# Patient Record
Sex: FEMALE | Race: WHITE | NOT HISPANIC OR LATINO | URBAN - METROPOLITAN AREA
[De-identification: names, ages, dates, MRNs, and addresses within clinical notes are randomized per-mention and may not be internally consistent; named-entity substitution may affect disease eponyms.]

---

## 2023-07-25 NOTE — ASU PATIENT PROFILE, ADULT - NSICDXPASTSURGICALHX_GEN_ALL_CORE_FT
PAST SURGICAL HISTORY:  H/O endoscopy 2016    H/O straightening of nasal septum 2017    S/P bunionectomy 2010    S/P  section 37 years ago    S/P colonoscopy 2018

## 2023-07-25 NOTE — ASU PATIENT PROFILE, ADULT - FALL HARM RISK - RISK INTERVENTIONS
Monitor gait and stability Reinforce activity limits and safety measures with patient and family/Physically safe environment - no spills, clutter or unnecessary equipment

## 2023-08-07 ENCOUNTER — OUTPATIENT (OUTPATIENT)
Dept: OUTPATIENT SERVICES | Facility: HOSPITAL | Age: 73
LOS: 1 days | End: 2023-08-07
Payer: MEDICARE

## 2023-08-07 VITALS
HEART RATE: 58 BPM | RESPIRATION RATE: 18 BRPM | HEIGHT: 64 IN | TEMPERATURE: 98 F | WEIGHT: 160.06 LBS | DIASTOLIC BLOOD PRESSURE: 72 MMHG | SYSTOLIC BLOOD PRESSURE: 165 MMHG

## 2023-08-07 VITALS
RESPIRATION RATE: 26 BRPM | HEART RATE: 47 BPM | SYSTOLIC BLOOD PRESSURE: 139 MMHG | DIASTOLIC BLOOD PRESSURE: 69 MMHG | OXYGEN SATURATION: 97 %

## 2023-08-07 DIAGNOSIS — Z98.891 HISTORY OF UTERINE SCAR FROM PREVIOUS SURGERY: Chronic | ICD-10-CM

## 2023-08-07 DIAGNOSIS — C90.00 MULTIPLE MYELOMA NOT HAVING ACHIEVED REMISSION: ICD-10-CM

## 2023-08-07 DIAGNOSIS — Z98.890 OTHER SPECIFIED POSTPROCEDURAL STATES: Chronic | ICD-10-CM

## 2023-08-07 LAB
APTT BLD: 31.3 SEC — SIGNIFICANT CHANGE UP (ref 24.5–35.6)
HCT VFR BLD CALC: 42.9 % — SIGNIFICANT CHANGE UP (ref 34.5–45)
HGB BLD-MCNC: 14.5 G/DL — SIGNIFICANT CHANGE UP (ref 11.5–15.5)
INR BLD: 0.84 RATIO — LOW (ref 0.85–1.18)
MCHC RBC-ENTMCNC: 32 PG — SIGNIFICANT CHANGE UP (ref 27–34)
MCHC RBC-ENTMCNC: 33.8 GM/DL — SIGNIFICANT CHANGE UP (ref 32–36)
MCV RBC AUTO: 94.7 FL — SIGNIFICANT CHANGE UP (ref 80–100)
NRBC # BLD: 0 /100 WBCS — SIGNIFICANT CHANGE UP (ref 0–0)
PLATELET # BLD AUTO: 221 K/UL — SIGNIFICANT CHANGE UP (ref 150–400)
PROTHROM AB SERPL-ACNC: 9.9 SEC — SIGNIFICANT CHANGE UP (ref 9.5–13)
RBC # BLD: 4.53 M/UL — SIGNIFICANT CHANGE UP (ref 3.8–5.2)
RBC # FLD: 14.4 % — SIGNIFICANT CHANGE UP (ref 10.3–14.5)
WBC # BLD: 5.24 K/UL — SIGNIFICANT CHANGE UP (ref 3.8–10.5)
WBC # FLD AUTO: 5.24 K/UL — SIGNIFICANT CHANGE UP (ref 3.8–10.5)

## 2023-08-07 PROCEDURE — 85610 PROTHROMBIN TIME: CPT

## 2023-08-07 PROCEDURE — 93010 ELECTROCARDIOGRAM REPORT: CPT

## 2023-08-07 PROCEDURE — 99205 OFFICE O/P NEW HI 60 MIN: CPT

## 2023-08-07 PROCEDURE — 85027 COMPLETE CBC AUTOMATED: CPT

## 2023-08-07 PROCEDURE — 85730 THROMBOPLASTIN TIME PARTIAL: CPT

## 2023-08-07 PROCEDURE — 93005 ELECTROCARDIOGRAM TRACING: CPT

## 2023-08-07 PROCEDURE — 36415 COLL VENOUS BLD VENIPUNCTURE: CPT

## 2023-08-07 RX ORDER — CLARITHROMYCIN 500 MG
1 TABLET ORAL
Refills: 0 | DISCHARGE

## 2023-08-07 RX ORDER — FENTANYL CITRATE 50 UG/ML
200 INJECTION INTRAVENOUS ONCE
Refills: 0 | Status: DISCONTINUED | OUTPATIENT
Start: 2023-08-07 | End: 2023-08-07

## 2023-08-07 RX ORDER — MIDAZOLAM HYDROCHLORIDE 1 MG/ML
4 INJECTION, SOLUTION INTRAMUSCULAR; INTRAVENOUS ONCE
Refills: 0 | Status: DISCONTINUED | OUTPATIENT
Start: 2023-08-07 | End: 2023-08-21

## 2023-08-07 NOTE — CONSULT NOTE ADULT - ASSESSMENT
72 year old female with past medical history of HTN GERD monoclonal gammopathy for bone marrow biopsy. In pre testing was noted to have asymptomatic Sinus bradycardia and TWI on ekg.    EKG Sr with precordial  t wave abnormality likely representing normal variant with episodes of sinus bradycardia on monitor 40's-60's  euvolemic on exam   no exertional chest pain dizziness dyspnea or syncope   bp stable 139/69    no other inpatient cardiac work up is indicated  patient is stable from cv standpoint to proceed with low risk procedure biopsy in IR     Monitor and replete electrolytes. Keep K>4.0 and Mg>2.0.  Jeniffer Bear FNP-C  Cardiology NP  call teams

## 2023-08-07 NOTE — CONSULT NOTE ADULT - SUBJECTIVE AND OBJECTIVE BOX
[facility] - Uma Mckeon, Bola Nix Savella, [place]   Office Number: [phone]    Initial Consult Note    CHIEF COMPLAINT: Patient is a 72y old  Female who presents with a chief complaint of abnormal ekg     HPI:  72 year old female with past medical history of HTN GERD monoclonal gammopathy for bone marror     PAST MEDICAL & SURGICAL HISTORY:  Monoclonal gammopathy      S/P bunionectomy        S/P colonoscopy  2018      H/O endoscopy  2016      S/P  section  37 years ago      H/O straightening of nasal septum  2017          SOCIAL HISTORY:  No tobacco, ethanol, or drug abuse.    FAMILY HISTORY:    No family history of acute MI or sudden cardiac death.    MEDICATIONS  (STANDING):  midazolam Injectable 4 milliGRAM(s) IV Push once    MEDICATIONS  (PRN):      Allergies    No Known Allergies    Intolerances        REVIEW OF SYSTEMS:  All other review of systems is negative unless indicated above    VITAL SIGNS:   Vital Signs Last 24 Hrs  T(C): 36.6 (07 Aug 2023 09:54), Max: 36.6 (07 Aug 2023 09:54)  T(F): 97.8 (07 Aug 2023 09:54), Max: 97.8 (07 Aug 2023 09:54)  HR: 47 (07 Aug 2023 10:24) (47 - 58)  BP: 139/69 (07 Aug 2023 10:24) (139/69 - 165/72)  BP(mean): --  RR: 26 (07 Aug 2023 10:24) (18 - 26)  SpO2: 97% (07 Aug 2023 10:24) (97% - 97%)    Parameters below as of 07 Aug 2023 10:24  Patient On (Oxygen Delivery Method): room air        I&O's Summary      On Exam:    Constitutional: NAD, alert and oriented x 3  Lungs:  Non-labored, breath sounds are clear bilaterally, No wheezing, rales or rhonchi  Cardiovascular: RRR.  S1 and S2 positive.  No murmurs, rubs, gallops or clicks  Gastrointestinal: Bowel Sounds present, soft, nontender.   Lymph: No peripheral edema. No cervical lymphadenopathy.  Neurological: Alert, no focal deficits  Skin: No rashes or ulcers   Psych:  Mood & affect appropriate.    LABS: All Labs Reviewed:          PT/INR - ( 07 Aug 2023 10:08 )   PT: 9.9 sec;   INR: 0.84 ratio         PTT - ( 07 Aug 2023 10:08 )  PTT:31.3 sec      Blood Culture:         RADIOLOGY:    EKG:         [facility] - Uma Mckeon, Bola Nix Savella, [place]   Office Number: [phone]    Initial Consult Note    CHIEF COMPLAINT: Patient is a 72y old  Female who presents with a chief complaint of abnormal ekg     HPI:  72 year old female with past medical history of HTN GERD monoclonal gammopathy for bone marrow biopsy. In pre testing was noted to have asymptomatic Sinus bradycardia and TWI on ekg. Seen at bedside with family present. No prior cardiac work up. No exertional chest pain dyspnea dizziness. Does have some intermittent palpitations occurs at night twice in the past couple of months. She was told from her doctor that this was her acid reflux as it only occurs when laying down.   Denies syncope. Offers no compalints on room air awaiting procedure.   PAST MEDICAL & SURGICAL HISTORY:  Monoclonal gammopathy      S/P bunionectomy        S/P colonoscopy  2018      H/O endoscopy  2016      S/P  section  37 years ago      H/O straightening of nasal septum  2017          SOCIAL HISTORY:  No tobacco, occasional ethanol, or drug abuse.    FAMILY HISTORY:  mother and father with cancer  No family history of acute MI or sudden cardiac death.    MEDICATIONS  (STANDING):  midazolam Injectable 4 milliGRAM(s) IV Push once    MEDICATIONS  (PRN):      Allergies    No Known Allergies    Intolerances        REVIEW OF SYSTEMS:  All other review of systems is negative unless indicated above    VITAL SIGNS:   Vital Signs Last 24 Hrs  T(C): 36.6 (07 Aug 2023 09:54), Max: 36.6 (07 Aug 2023 09:54)  T(F): 97.8 (07 Aug 2023 09:54), Max: 97.8 (07 Aug 2023 09:54)  HR: 47 (07 Aug 2023 10:24) (47 - 58)  BP: 139/69 (07 Aug 2023 10:24) (139/69 - 165/72)  BP(mean): --  RR: 26 (07 Aug 2023 10:24) (18 - 26)  SpO2: 97% (07 Aug 2023 10:24) (97% - 97%)    Parameters below as of 07 Aug 2023 10:24  Patient On (Oxygen Delivery Method): room air        I&O's Summary      On Exam:    Constitutional: NAD, alert and oriented x 3  Lungs:  Non-labored, breath sounds are clear bilaterally, No wheezing, rales or rhonchi  Cardiovascular: RRR.  S1 and S2 positive.  No murmurs, rubs, gallops or clicks  Gastrointestinal: Bowel Sounds present, soft, nontender.   Lymph: No peripheral edema. No cervical lymphadenopathy.  Neurological: Alert, no focal deficits  Skin: No rashes or ulcers   Psych:  Mood & affect appropriate.    LABS: All Labs Reviewed:          PT/INR - ( 07 Aug 2023 10:08 )   PT: 9.9 sec;   INR: 0.84 ratio         PTT - ( 07 Aug 2023 10:08 )  PTT:31.3 sec      Blood Culture:         RADIOLOGY:    EKG:

## 2023-08-07 NOTE — ASU DISCHARGE PLAN (ADULT/PEDIATRIC) - NS MD DC FALL RISK RISK
For information on Fall & Injury Prevention, visit: [email or web address] OR  [email or web address] OR  https://www.cdc.gov/steadi/patient.html

## 2023-08-07 NOTE — CHART NOTE - NSCHARTNOTEFT_GEN_A_CORE
Pt cleared for procedure by cardiology.  However, due to bradycardia, would only be able to sedate with versed. Pt would prefer to be sedated by anesthesiology, so will be rescheduled for a date when anesthesiology is available.

## 2023-08-07 NOTE — CHART NOTE - NSCHARTNOTEFT_GEN_A_CORE
Vascular & Interventional Radiology Pre-Procedure Note    Procedure Name: CT guided bone marrow biopsy with moderate sedation    HPI: 72y Female with monoclonal gammopathy    72y Female     PMH/PSH  Monoclonal gammopathy  HTN  GERD    S/P bunionectomy  S/P colonoscopy  H/O endoscopy  S/P  section  H/O straightening of nasal septum    ASA: 2    Allergies: No Known Allergies      Medications (Abx/Cardiac/Anticoagulation/Blood Products)  Claritin  Atorvastatin  Vitamin D3    Data:  162.6  72.6    T(C): 36.6  HR: 58 (goes down to 47)  BP: 165/72  RR: 18  SpO2: 99    Exam  General: WNL  Chest: WNL  Abdomen: WNL  Extremities: WNL  Mallampati: 1    Lab  plts: 216  INR: 0.84      Imaging: na    Plan:   -72y Female presents for CT guided bone marrow biopsy.  Mallampati 1  ASA  2.    -Sinus Bradycardia, no hx of cardiac disease. Per Dr. Ureña of anesthesia, Okay to sedate with Versed only. Will not use fentanyl.  -The patient is a suitable candidate for the procedure with moderate sedation (Versed only)  -Sedation Plan:  IV sedation with monitoring (Versed only)  -Risks/Benefits/alternatives explained with the patient and witnessed informed consent obtained. Vascular & Interventional Radiology Pre-Procedure Note    Procedure Name: CT guided bone marrow biopsy with moderate sedation    HPI: 72y Female with monoclonal gammopathy    72y Female     PMH/PSH  Monoclonal gammopathy  HTN  GERD    S/P bunionectomy  S/P colonoscopy  H/O endoscopy  S/P  section  H/O straightening of nasal septum    ASA: 2    Allergies: No Known Allergies      Medications (Abx/Cardiac/Anticoagulation/Blood Products)  Claritin  Atorvastatin  Vitamin D3    Data:  162.6  72.6    T(C): 36.6  HR: 58 (goes down to 47)  BP: 165/72  RR: 18  SpO2: 99    Exam  General: WNL  Chest: WNL  Abdomen: WNL  Extremities: WNL  Mallampati: 1    Lab  plts: 216  INR: 0.84    EKG: Preliminary automated interpretation:  Sinus Vlad, ST abnormalities, consider Anterior ischemia      Imaging: na    Plan:   -72y Female presents for CT guided bone marrow biopsy.  Mallampati 1  ASA  2.    -HTN with sinus Bradycardia, no hx of cardiac disease. EKG with possible anterior ischemia, will have pt evaluated by Dr. Eaton of Cardiology and if cleared, will proceed.  Per Dr. Ureña of anesthesia, Okay to sedate with Versed only. Will not use fentanyl due to bradycardia.  -If cleared by cardiology, the patient is a suitable candidate for the procedure with moderate sedation (Versed only)  -Sedation Plan:  IV sedation with monitoring (Versed only)  -Risks/Benefits/alternatives explained with the patient and witnessed informed consent obtained. Vascular & Interventional Radiology Pre-Procedure Note    Procedure Name: CT guided bone marrow biopsy with moderate sedation    HPI: 72y Female with monoclonal gammopathy    72y Female     PMH/PSH  Monoclonal gammopathy  HTN  GERD    S/P bunionectomy  S/P colonoscopy  H/O endoscopy  S/P  section  H/O straightening of nasal septum    ASA: 2    Allergies: No Known Allergies      Medications (Abx/Cardiac/Anticoagulation/Blood Products)  Claritin  Atorvastatin  Vitamin D3    Data:  162.6  72.6    T(C): 36.6  HR: 58 (goes down to 47)  BP: 165/72  RR: 18  SpO2: 99    Exam  General: WNL  Chest: WNL  Abdomen: WNL  Extremities: WNL  Mallampati: 1    Lab  plts: 216  INR: 0.84    EKG: Preliminary automated interpretation:  Sinus Vlad, ST abnormalities, consider Anterior ischemia      Imaging: na    Plan:   -72y Female presents for CT guided bone marrow biopsy.  Mallampati 1  ASA  2.    -HTN with sinus Bradycardia, no hx of cardiac disease. EKG with possible anterior ischemia although the automated report is likely artifactual.  Will have pt evaluated by Dr. Eaton of Cardiology and if cleared, will proceed with the biopsy.  Per Dr. Ureña of anesthesia, Okay to sedate with Versed only. Will not use fentanyl due to bradycardia.  -If cleared by cardiology, the patient is a suitable candidate for the procedure with moderate sedation (Versed only)  -Sedation Plan:  IV sedation with monitoring (Versed only)  -Risks/Benefits/alternatives explained with the patient and witnessed informed consent obtained.

## 2023-08-07 NOTE — CONSULT NOTE ADULT - NS ATTEND AMEND GEN_ALL_CORE FT
asymp sb  right precordial twa normal variant  no baseline sxs of structural heart disease  optimized for planned bm bx, no addl testing needed at this time

## 2023-08-14 NOTE — ASU PATIENT PROFILE, ADULT - FALL HARM RISK - RISK INTERVENTIONS
Reinforce activity limits and safety measures with patient and family/Physically safe environment - no spills, clutter or unnecessary equipment

## 2023-08-16 PROBLEM — D47.2 MONOCLONAL GAMMOPATHY: Chronic | Status: ACTIVE | Noted: 2023-07-25

## 2023-08-16 RX ORDER — CYPROHEPTADINE HYDROCHLORIDE 4 MG/1
1 TABLET ORAL
Refills: 0 | DISCHARGE

## 2023-08-17 ENCOUNTER — OUTPATIENT (OUTPATIENT)
Dept: OUTPATIENT SERVICES | Facility: HOSPITAL | Age: 73
LOS: 1 days | End: 2023-08-17
Payer: MEDICARE

## 2023-08-17 VITALS
HEART RATE: 44 BPM | SYSTOLIC BLOOD PRESSURE: 139 MMHG | RESPIRATION RATE: 15 BRPM | OXYGEN SATURATION: 98 % | TEMPERATURE: 98 F | DIASTOLIC BLOOD PRESSURE: 76 MMHG

## 2023-08-17 VITALS
RESPIRATION RATE: 16 BRPM | HEIGHT: 64 IN | DIASTOLIC BLOOD PRESSURE: 81 MMHG | SYSTOLIC BLOOD PRESSURE: 151 MMHG | HEART RATE: 56 BPM | TEMPERATURE: 98 F | WEIGHT: 160.06 LBS | OXYGEN SATURATION: 100 %

## 2023-08-17 DIAGNOSIS — Z98.890 OTHER SPECIFIED POSTPROCEDURAL STATES: Chronic | ICD-10-CM

## 2023-08-17 DIAGNOSIS — Z98.891 HISTORY OF UTERINE SCAR FROM PREVIOUS SURGERY: Chronic | ICD-10-CM

## 2023-08-17 DIAGNOSIS — C90.00 MULTIPLE MYELOMA NOT HAVING ACHIEVED REMISSION: ICD-10-CM

## 2023-08-17 PROCEDURE — 88364 INSITU HYBRIDIZATION (FISH): CPT | Mod: 26

## 2023-08-17 PROCEDURE — 20225 BONE BIOPSY TROCAR/NDL DEEP: CPT

## 2023-08-17 PROCEDURE — 88365 INSITU HYBRIDIZATION (FISH): CPT | Mod: 26

## 2023-08-17 PROCEDURE — 88300 SURGICAL PATH GROSS: CPT | Mod: 26

## 2023-08-17 PROCEDURE — 88342 IMHCHEM/IMCYTCHM 1ST ANTB: CPT | Mod: 26

## 2023-08-17 PROCEDURE — 88291 CYTO/MOLECULAR REPORT: CPT

## 2023-08-17 PROCEDURE — 77012 CT SCAN FOR NEEDLE BIOPSY: CPT | Mod: 26

## 2023-08-17 PROCEDURE — 88312 SPECIAL STAINS GROUP 1: CPT | Mod: 26

## 2023-08-17 PROCEDURE — 88305 TISSUE EXAM BY PATHOLOGIST: CPT | Mod: 26

## 2023-08-17 PROCEDURE — 85097 BONE MARROW INTERPRETATION: CPT

## 2023-08-17 PROCEDURE — 88341 IMHCHEM/IMCYTCHM EA ADD ANTB: CPT | Mod: 26

## 2023-08-17 PROCEDURE — 88313 SPECIAL STAINS GROUP 2: CPT | Mod: 26

## 2023-08-17 NOTE — ASU DISCHARGE PLAN (ADULT/PEDIATRIC) - NS MD DC FALL RISK RISK
For information on Fall & Injury Prevention, visit: [email or web address] OR  [email or web address] OR  https://www.cdc.gov/steadi/patient.html

## 2023-08-17 NOTE — ASU DISCHARGE PLAN (ADULT/PEDIATRIC) - ASU DC SPECIAL INSTRUCTIONSFT
Biopsy Discharge    Discharge Instructions  - You have had a biopsy of the left iliac bone marrow.   - You may shower in 24 hours. No soaking or swimming until the site is completely healed.  - Keep the area covered and dry for the next 24 hours.  - Do not perform any heavy lifting for the next few days or until the site is healed.  - You may resume your normal diet.  - You may resume your normal medications however you should wait 48 hours before restarting aspirin, plavix, or blood thinners.  - It is normal to experience some pain over the site for the next few days. You may take Tylenol for that pain. Do not take more frequently than every 6 hours and do not exceed more than 3000mg of Tylenol in a 24 hour period.    - You were given conscious sedation which may make you drowsy, therefore you need someone to stay with you until the morning following the procedure.  - Do not drive, engage in heavy lifting or strenuous activity, or drink any alcoholic beverages for the next 24 hours.   - You may resume normal activity in 24 hours.    Notify your primary physician and/or Interventional Radiology IMMEDIATELY if you experience any of the following       - Fever of 101F or 38C       - Chills or Rigors/ Shakes       - Swelling and/or Redness in the area around the biopsy site       - Worsening Pain       - Blood soaked bandages or worsening bleeding       - Lightheadedness and/or dizziness upon standing       - Chest Pain/ Tightness       - Shortness of Breath       - Difficulty walking    If you have a problem that you believe requires IMMEDIATE attention, please go to your NEAREST Emergency Room. If you believe your problem can safely wait until you speak to a physician, please call Interventional Radiology for any concerns.    Please feel free to contact us at [phone] if any problems arise.

## 2023-08-17 NOTE — PRE PROCEDURE NOTE - PRE PROCEDURE EVALUATION
Interventional Radiology Pre-Procedure Note    Patient is a 72y old Female who presents with monoclonal gammopathy here for bone marrow biopsy.    PAST MEDICAL & SURGICAL HISTORY:  Monoclonal gammopathy      S/P bunionectomy        S/P colonoscopy  2018      H/O endoscopy  2016      S/P  section  37 years ago      H/O straightening of nasal septum  2017           Allergies: No Known Allergies        Procedure/ risks/ benefits were explained, informed consent obtained from patient, verbalizes understanding.

## 2023-08-23 PROCEDURE — 88341 IMHCHEM/IMCYTCHM EA ADD ANTB: CPT

## 2023-08-23 PROCEDURE — 88184 FLOWCYTOMETRY/ TC 1 MARKER: CPT

## 2023-08-23 PROCEDURE — 88312 SPECIAL STAINS GROUP 1: CPT

## 2023-08-23 PROCEDURE — 88271 CYTOGENETICS DNA PROBE: CPT

## 2023-08-23 PROCEDURE — 88342 IMHCHEM/IMCYTCHM 1ST ANTB: CPT

## 2023-08-23 PROCEDURE — 88305 TISSUE EXAM BY PATHOLOGIST: CPT

## 2023-08-23 PROCEDURE — 88280 CHROMOSOME KARYOTYPE STUDY: CPT

## 2023-08-23 PROCEDURE — 88237 TISSUE CULTURE BONE MARROW: CPT

## 2023-08-23 PROCEDURE — 85097 BONE MARROW INTERPRETATION: CPT

## 2023-08-23 PROCEDURE — 88360 TUMOR IMMUNOHISTOCHEM/MANUAL: CPT

## 2023-08-23 PROCEDURE — 88365 INSITU HYBRIDIZATION (FISH): CPT

## 2023-08-23 PROCEDURE — 20225 BONE BIOPSY TROCAR/NDL DEEP: CPT

## 2023-08-23 PROCEDURE — 88313 SPECIAL STAINS GROUP 2: CPT

## 2023-08-23 PROCEDURE — 88275 CYTOGENETICS 100-300: CPT

## 2023-08-23 PROCEDURE — 88300 SURGICAL PATH GROSS: CPT

## 2023-08-23 PROCEDURE — 88185 FLOWCYTOMETRY/TC ADD-ON: CPT

## 2023-08-23 PROCEDURE — 87205 SMEAR GRAM STAIN: CPT

## 2023-08-23 PROCEDURE — 77012 CT SCAN FOR NEEDLE BIOPSY: CPT

## 2023-08-23 PROCEDURE — 88264 CHROMOSOME ANALYSIS 20-25: CPT

## 2023-10-27 NOTE — ASU PATIENT PROFILE, ADULT - NS TRANSFER DENTURES
Quality 130: Documentation Of Current Medications In The Medical Record: Current Medications Documented Detail Level: Detailed Quality 431: Preventive Care And Screening: Unhealthy Alcohol Use - Screening: Patient not identified as an unhealthy alcohol user when screened for unhealthy alcohol use using a systematic screening method Quality 226: Preventive Care And Screening: Tobacco Use: Screening And Cessation Intervention: Patient screened for tobacco use and is an ex/non-smoker n/a